# Patient Record
(demographics unavailable — no encounter records)

---

## 2025-01-22 NOTE — COUNSELING
[Decrease Portions] : decrease portions [Good understanding] : Patient has a good understanding of disease, goals and obesity follow-up plan [FreeTextEntry2] : healthy diet reviewed w pt

## 2025-01-22 NOTE — HISTORY OF PRESENT ILLNESS
[FreeTextEntry1] : CPE [de-identified] : vaccine- pt got flu vac - 10/2024, COVID vac -12/2024.  got RSV vac last yr diet- healthy diet reviewed exercise- .--spin class 3x per week; treadmill or walk outside, daily. - works out w Trainer- no CP or SOB reviewed 1/16/25 labs-nl L Achilles tendonitis- started after 1/1/125- pt had done several  Spin classes.- seen Podiatrist- pt was tx w cool compress and Meloxicam but stopped it.  now 75 % better. 1 yr- c/o lower lip numbness- sev times a wk. reviewed 8/20/24 Neuro notes- unclear etiology.  doesn't appear to be a CNS issue shoulder pain- during golf season-now not affecting him reviewed 9/5/24 Ortho notes. - L tendonitis, R RTC arthropathy.  did PT for 8 wk and doing the therapy at home. -75 % better-  R wrist tendonitis- resolved reviewed 3/18/24 colonoscopy- polyp , hemorrhoid- rpt 3 yrs.  reviewed bx- sessile serrated polyp nonocclusive CAD on remote CTA -reviewed 1/14/22 CT chest- stable nodules c/o 2016.   reviewed 1/8/20 ECHO- mild TR, mild dilated LA reviewed 10/1/24 Cardio notes.  reviewed 10/31/24 ECHO- diastolic dysf, mild TR reviewed 10/31/24 Stress test- non diagnostic due to low HR chol- on rosuvastatin- rec taking in the evening. urology---sees urologist yearly melanoma-sees derm 1-2/ yr - seen a few wks ago

## 2025-01-22 NOTE — PHYSICAL EXAM
[No Acute Distress] : no acute distress [Well Nourished] : well nourished [Well Developed] : well developed [Well-Appearing] : well-appearing [Normal Sclera/Conjunctiva] : normal sclera/conjunctiva [Normal TMs] : both tympanic membranes were normal [No Lymphadenopathy] : no lymphadenopathy [Supple] : supple [Thyroid Normal, No Nodules] : the thyroid was normal and there were no nodules present [No Respiratory Distress] : no respiratory distress  [No Accessory Muscle Use] : no accessory muscle use [Clear to Auscultation] : lungs were clear to auscultation bilaterally [Normal Rate] : normal rate  [Regular Rhythm] : with a regular rhythm [Normal S1, S2] : normal S1 and S2 [No Murmur] : no murmur heard [No Carotid Bruits] : no carotid bruits [Pedal Pulses Present] : the pedal pulses are present [No Edema] : there was no peripheral edema [Soft] : abdomen soft [Non Tender] : non-tender [Non-distended] : non-distended [No Masses] : no abdominal mass palpated [Normal Bowel Sounds] : normal bowel sounds [Normal Supraclavicular Nodes] : no supraclavicular lymphadenopathy [Normal Axillary Nodes] : no axillary lymphadenopathy [Normal Posterior Cervical Nodes] : no posterior cervical lymphadenopathy [Normal Anterior Cervical Nodes] : no anterior cervical lymphadenopathy [Normal Inguinal Nodes] : no inguinal lymphadenopathy [Grossly Normal Strength/Tone] : grossly normal strength/tone [No Focal Deficits] : no focal deficits [Normal Gait] : normal gait [Normal Affect] : the affect was normal [Normal Insight/Judgement] : insight and judgment were intact [de-identified] : good ROM of L ankle no pain- swelling above L achilles.  pt has abn gait on the L side

## 2025-01-22 NOTE — HEALTH RISK ASSESSMENT
[Good] : ~his/her~  mood as  good [Yes] : Yes [1 or 2 (0 pts)] : 1 or 2 (0 points) [Never (0 pts)] : Never (0 points) [No] : In the past 12 months have you used drugs other than those required for medical reasons? No [No falls in past year] : Patient reported no falls in the past year [0] : 2) Feeling down, depressed, or hopeless: Not at all (0) [PHQ-2 Negative - No further assessment needed] : PHQ-2 Negative - No further assessment needed [Patient reported colonoscopy was abnormal] : Patient reported colonoscopy was abnormal [None] : None [With Family] : lives with family [# of Members in Household ___] :  household currently consist of [unfilled] member(s) [Employed] : employed [Graduate School] : graduate school [] :  [# Of Children ___] : has [unfilled] children [Sexually Active] : sexually active [Feels Safe at Home] : Feels safe at home [Fully functional (bathing, dressing, toileting, transferring, walking, feeding)] : Fully functional (bathing, dressing, toileting, transferring, walking, feeding) [Fully functional (using the telephone, shopping, preparing meals, housekeeping, doing laundry, using] : Fully functional and needs no help or supervision to perform IADLs (using the telephone, shopping, preparing meals, housekeeping, doing laundry, using transportation, managing medications and managing finances) [Reports normal functional visual acuity (ie: able to read med bottle)] : Reports normal functional visual acuity [Smoke Detector] : smoke detector [Carbon Monoxide Detector] : carbon monoxide detector [Seat Belt] :  uses seat belt [Sunscreen] : uses sunscreen [With Patient/Caregiver] : , with patient/caregiver [Designated Healthcare Proxy] : Designated healthcare proxy [Name: ___] : Health Care Proxy's Name: [unfilled]  [Relationship: ___] : Relationship: [unfilled] [I will adhere to the patient's wishes.] : I will adhere to the patient's wishes. [Monthly or less (1 pt)] : Monthly or less (1 point) [Former] : Former [> 15 Years] : > 15 Years [NO] : No [HIV test declined] : HIV test declined [Hepatitis C test declined] : Hepatitis C test declined [de-identified] : urology [Audit-CScore] : 1 [de-identified] : see above [de-identified] : ad rosy [[id number]] : 0 [Change in mental status noted] : No change in mental status noted [Language] : denies difficulty with language [Behavior] : denies difficulty with behavior [Learning/Retaining New Information] : denies difficulty learning/retaining new information [Handling Complex Tasks] : denies difficulty handling complex tasks [Reasoning] : denies difficulty with reasoning [Spatial Ability and Orientation] : denies difficulty with spatial ability and orientation [High Risk Behavior] : no high risk behavior [Reports changes in hearing] : Reports no changes in hearing [Reports changes in vision] : Reports no changes in vision [Reports changes in dental health] : Reports no changes in dental health [Travel to Developing Areas] : does not  travel to developing areas [TB Exposure] : is not being exposed to tuberculosis [ColonoscopyDate] : 03/24 [ColonoscopyComments] : polyp , hemorrhoid- rpt 3 yrs.  reviewed bx- sessile serrated polyp [de-identified] : [place]  [FreeTextEntry2] : teacher, part time [de-identified] : seen ophth- 11/2024 [de-identified] : has appt w dentist next mo- sees dentist every 6 mo [AdvancecareDate] : 01/25

## 2025-01-22 NOTE — PLAN
[FreeTextEntry1] : 5 minutes were spent administering an evidence based ASCVD Risk Assessment tool showing patient's risk being calculated as   % and discussing the results with patient including lifestyle modifications to be taken as well as treatment options with statin medication.

## 2025-05-06 NOTE — ADDENDUM
[FreeTextEntry1] : This note was written by Angelika Devries on 04/29/2025 acting solely as a scribe for Dr. Eulalio South.   All medical record entries made by the Scribe were at my, Dr. Eulalio South, direction and personally dictated by me on 04/29/2025. I have personally reviewed the chart and agree that the record accurately reflects my personal performance of the history, physical exam, assessment and plan.

## 2025-05-06 NOTE — HISTORY OF PRESENT ILLNESS
[de-identified] : 76-year-old male with a history of right-hand dominance who presents for follow-up of bilateral shoulder pain;  R>L . He reports that the pain is intermittent and its triggers are unclear. He denies any recent injury. Since his last visit, the patient has been receiving massage therapy, which has provided significant relief; his last session was in September. He would like to resume massage therapy sessions. He has been performing exercises learned in physical therapy independently, which have also been beneficial. The pain responds to Advil and was previously alleviated by a corticosteroid injection (CSI).

## 2025-05-06 NOTE — DISCUSSION/SUMMARY
[de-identified] : 77 y/o male with left shoulder RTC tendinopathy/right shoulder RTC arthropathy.   Patient presents with continued pain to the shoulder is consistent with underlying rotator cuff dysfunction and arthropathy on the right.  Clinically however, he has maintained good functionality of the shoulders. Patient is considering additional modalities of treatment including local massage therapy and anti-inflammatory control.  We discussed continuation of supportive care and activity modification at this time.   Definitive treatment of total joint arthroplasty would be considered for failure of conservative management, progressive pain, and ultimate inability to perform ADLs. Discussed this would be a revTSA.   Recommendations: Continue HEP. supportive care (rest, ice, NSAIDs, activity modifications).  Massage therapy Rx given.  Follow-up as needed

## 2025-05-06 NOTE — PHYSICAL EXAM
[de-identified] : Right shoulder exam:  Inspection: No malalignment, No defects, No atrophy Skin: No masses, No lesions Neck: Negative Spurling, full ROM, no pain with ROM AROM: FF to 180, abduction to 90, ER to 80, IR to upper lumbar Painful arc ROM: none Tenderness: No bicipital tenderness, no tenderness to greater tuberosity/RTC insertion, no anterior shoulder/lesser tuberosity tenderness Strength: 4/5 ER, 5/5 IR in adduction, 4+/5 supraspinatus testing, negative Spink's test AC joint: No TTP/pain with cross arm testing Biceps: Speed Negative, Yergason Negative  Impingement test: Negative Mae, Negative Neer Vasc: 2+ radial pulse  Stability: Stable  Neuro: AIN, PIN, Ulnar nerve intact to motor Sensation: Intact to light touch throughout   Left shoulder exam:  Inspection: No malalignment, No defects, No atrophy Skin: No masses, No lesions Neck: Negative Spurling, full ROM, no pain with ROM AROM: FF to 180, abduction to 90, ER to 80, IR to upper lumbar Painful arc ROM: None Tenderness: No bicipital tenderness, positive tenderness to greater tuberosity/RTC insertion, no anterior shoulder/lesser tuberosity tenderness Strength: 5/5 ER, 5/5 IR in adduction, 5/5 supraspinatus testing, positive Spink's test AC joint: No TTP/pain with cross arm testing Biceps: Speed Negative, Yergason Negative  Impingement test: negative Mae, negative Neer Vasc: 2+ radial pulse  Stability: Stable  Neuro: AIN, PIN, Ulnar nerve intact to motor Sensation: Intact to light touch throughout  [de-identified] : 3 views of right shoulder were obtained 05/04/2023, that show no acute fracture or dislocation. There is trace glenohumeral and mild AC joint degenerative change seen. Type II acromion. There is mild humeral head elevation.  3 views of left shoulder were obtained 05/04/2023, that show no acute fracture or dislocation. There is no glenohumeral and mild AC joint degenerative change seen. Type II acromion. There is no significant malalignment. No significant other obvious osseous abnormality, otherwise unremarkable.

## 2025-05-06 NOTE — DISCUSSION/SUMMARY
[de-identified] : 77 y/o male with left shoulder RTC tendinopathy/right shoulder RTC arthropathy.   Patient presents with continued pain to the shoulder is consistent with underlying rotator cuff dysfunction and arthropathy on the right.  Clinically however, he has maintained good functionality of the shoulders. Patient is considering additional modalities of treatment including local massage therapy and anti-inflammatory control.  We discussed continuation of supportive care and activity modification at this time.   Definitive treatment of total joint arthroplasty would be considered for failure of conservative management, progressive pain, and ultimate inability to perform ADLs. Discussed this would be a revTSA.   Recommendations: Continue HEP. supportive care (rest, ice, NSAIDs, activity modifications).  Massage therapy Rx given.  Follow-up as needed

## 2025-05-06 NOTE — PHYSICAL EXAM
[de-identified] : Right shoulder exam:  Inspection: No malalignment, No defects, No atrophy Skin: No masses, No lesions Neck: Negative Spurling, full ROM, no pain with ROM AROM: FF to 180, abduction to 90, ER to 80, IR to upper lumbar Painful arc ROM: none Tenderness: No bicipital tenderness, no tenderness to greater tuberosity/RTC insertion, no anterior shoulder/lesser tuberosity tenderness Strength: 4/5 ER, 5/5 IR in adduction, 4+/5 supraspinatus testing, negative Deschutes's test AC joint: No TTP/pain with cross arm testing Biceps: Speed Negative, Yergason Negative  Impingement test: Negative Mae, Negative Neer Vasc: 2+ radial pulse  Stability: Stable  Neuro: AIN, PIN, Ulnar nerve intact to motor Sensation: Intact to light touch throughout   Left shoulder exam:  Inspection: No malalignment, No defects, No atrophy Skin: No masses, No lesions Neck: Negative Spurling, full ROM, no pain with ROM AROM: FF to 180, abduction to 90, ER to 80, IR to upper lumbar Painful arc ROM: None Tenderness: No bicipital tenderness, positive tenderness to greater tuberosity/RTC insertion, no anterior shoulder/lesser tuberosity tenderness Strength: 5/5 ER, 5/5 IR in adduction, 5/5 supraspinatus testing, positive [place]utes's test AC joint: No TTP/pain with cross arm testing Biceps: Speed Negative, Yergason Negative  Impingement test: negative Mae, negative Neer Vasc: 2+ radial pulse  Stability: Stable  Neuro: AIN, PIN, Ulnar nerve intact to motor Sensation: Intact to light touch throughout  [de-identified] : 3 views of right shoulder were obtained 05/04/2023, that show no acute fracture or dislocation. There is trace glenohumeral and mild AC joint degenerative change seen. Type II acromion. There is mild humeral head elevation.  3 views of left shoulder were obtained 05/04/2023, that show no acute fracture or dislocation. There is no glenohumeral and mild AC joint degenerative change seen. Type II acromion. There is no significant malalignment. No significant other obvious osseous abnormality, otherwise unremarkable.

## 2025-05-06 NOTE — HISTORY OF PRESENT ILLNESS
[de-identified] : 76-year-old male with a history of right-hand dominance who presents for follow-up of bilateral shoulder pain;  R>L . He reports that the pain is intermittent and its triggers are unclear. He denies any recent injury. Since his last visit, the patient has been receiving massage therapy, which has provided significant relief; his last session was in September. He would like to resume massage therapy sessions. He has been performing exercises learned in physical therapy independently, which have also been beneficial. The pain responds to Advil and was previously alleviated by a corticosteroid injection (CSI).

## 2025-07-22 NOTE — HISTORY OF PRESENT ILLNESS
[FreeTextEntry1] : Chol [de-identified] :  exercise- .--spin class 3x per week; treadmill or walk outside, daily. - works out w Trainer- no CP or SOB reviewed 7/15/25 labs-nl- pt dev hematoma and ecchymosis after blood draw- better but still sm lump. L Achilles tendonitis- now resolved- pt had done several  Spin classes.- seen Podiatrist- pt was tx w cool compress and Meloxicam but stopped it.  now 75 % better. reviewed 2/3/25, 3/17/25 Ortho notes 1 yr- c/o lower lip numbness- sev times a wk. reviewed 8/20/24 Neuro notes- unclear etiology.  doesn't appear to be a CNS issue shoulder pain-resolved during golf season-now not affecting him- L rotator cuff 4/29/25 Ortho, hand notes reviewed 3/18/24 colonoscopy- polyp , hemorrhoid- rpt 3 yrs.  reviewed bx- sessile serrated polyp nonocclusive CAD on remote CTA -reviewed 1/14/22 CT chest- stable nodules c/o 2016.   reviewed 1/8/20 ECHO- mild TR, mild dilated LA reviewed 10/1/24 Cardio notes.  reviewed 10/31/24 ECHO- diastolic dysf, mild TR reviewed 10/31/24 Stress test- non diagnostic due to low HR chol- on rosuvastatin- rec taking in the evening.compliant w med and occasionally non compliant w diet urology---sees urologist yearly melanoma-sees derm 1-2/ yr -

## 2025-07-22 NOTE — PHYSICAL EXAM
[de-identified] : bruising over R inner elbow- sm nodular area still present [TextEntry] : Constitutional: no acute distress, well nourished, well developed and well-appearing. Pulmonary: no respiratory distress, lungs were clear to auscultation bilaterally, no accessory muscle use. Cardiac: normal rate, with a regular rhythm, normal S1 and S2 and no murmur heard.  Vascular: there was no peripheral edema. Abdomen: abdomen soft, non-tender, non-distended, no abdominal mass palpated and normal bowel sounds. Psychiatric: the affect was normal and insight and judgement were intact

## 2025-07-22 NOTE — PLAN
[FreeTextEntry1] : BP- pt  will ck home bp - if more than 130's w several reading - he will send me the readings on the portal if hematoma not resolved after heat after 2-3 wk- he will call me and will order US art.